# Patient Record
Sex: FEMALE | Race: WHITE | NOT HISPANIC OR LATINO | Employment: FULL TIME
[De-identification: names, ages, dates, MRNs, and addresses within clinical notes are randomized per-mention and may not be internally consistent; named-entity substitution may affect disease eponyms.]

---

## 2017-12-03 ENCOUNTER — HEALTH MAINTENANCE LETTER (OUTPATIENT)
Age: 27
End: 2017-12-03

## 2020-03-02 ENCOUNTER — HEALTH MAINTENANCE LETTER (OUTPATIENT)
Age: 30
End: 2020-03-02

## 2023-06-20 NOTE — TELEPHONE ENCOUNTER
DIAGNOSIS: LT hip pain / no img / self / [employer] / [facility]   APPOINTMENT DATE: 6/27/23   NOTES STATUS DETAILS   MEDICATION LIST Internal

## 2023-06-27 ENCOUNTER — PRE VISIT (OUTPATIENT)
Dept: ORTHOPEDICS | Facility: CLINIC | Age: 33
End: 2023-06-27

## 2023-06-27 ENCOUNTER — ANCILLARY PROCEDURE (OUTPATIENT)
Dept: GENERAL RADIOLOGY | Facility: CLINIC | Age: 33
End: 2023-06-27
Attending: FAMILY MEDICINE
Payer: COMMERCIAL

## 2023-06-27 ENCOUNTER — OFFICE VISIT (OUTPATIENT)
Dept: ORTHOPEDICS | Facility: CLINIC | Age: 33
End: 2023-06-27
Payer: COMMERCIAL

## 2023-06-27 DIAGNOSIS — M25.552 ACUTE PAIN OF LEFT HIP: Primary | ICD-10-CM

## 2023-06-27 DIAGNOSIS — M25.559 HIP PAIN: ICD-10-CM

## 2023-06-27 DIAGNOSIS — Z87.312 HISTORY OF STRESS FRACTURE OF HIP: ICD-10-CM

## 2023-06-27 PROCEDURE — 73502 X-RAY EXAM HIP UNI 2-3 VIEWS: CPT | Mod: GC | Performed by: RADIOLOGY

## 2023-06-27 PROCEDURE — 99203 OFFICE O/P NEW LOW 30 MIN: CPT | Performed by: FAMILY MEDICINE

## 2023-06-27 NOTE — PROGRESS NOTES
"CHIEF COMPLAINT:  Pain of the Left Hip     HISTORY OF PRESENT ILLNESS  Ms. Medel is a pleasant 32 year old year old female who presents to clinic today with left hip pain.  Diane explains that she has had left hip for 3-4 weeks without injury. The pain is located over the groin/anterior hip and also posterior hip.     Pain is worse with running activities or long bike rides. Training in April/May for 10-mile run.  Ran 3-5 miles/day ever other day.  She is not taking any medication for pain. She has a hx of stress fractures, in the left hip as well. Most recent stress fracture was in 2020 left sacrum. She believes she has hd 2 stress fx in the left hip, 1 in sacrum and one in left foot, fourth metatarsal back in 2014.     Onset: gradual  Location: left hip  Quality:  aching  Duration: 1 months   Severity: 1/10 at worst  Timing:intermittent episodes   Modifying factors:  resting and non-use makes it better, movement and use makes it worse  Associated signs & symptoms: pain  Previous similar pain: No  Treatments to date: None    Additional history: Diane notes that she has had a history of anorexia lasting into adulthood but not in the last \"few years\".  Had no issues with pregnancy and no longer follows a calorie restricted diet.  Takes Calcium 500mg daily.  Likes ice cream and low fat dairy. Menstrual cycles are not normal due to OCP since delivery of child.    Review of Systems:    Have you recently had a a fever, chills, weight loss? No    Do you have any vision problems? No    Do you have any chest pain or edema? No    Do you have any shortness of breath or wheezing?  No    Do you have stomach problems? No    Do you have any numbness or focal weakness? No    Do you have diabetes? No    Do you have problems with bleeding or clotting? No    Do you have an rashes or other skin lesions? No    MEDICAL HISTORY  Patient Active Problem List   Diagnosis     CARDIOVASCULAR SCREENING; LDL GOAL LESS THAN 160     Atypical " nevus     BMI <19     Atypical nevus of buttock       Current Outpatient Medications   Medication Sig Dispense Refill     sertraline (ZOLOFT) 50 MG tablet Take 50 mg by mouth daily       norethindrone-ethinyl estradiol-iron (MICROGESTIN FE1.5/30) 1.5-30 MG-MCG tablet Take 1 tablet by mouth daily (Patient not taking: Reported on 6/27/2023) 3 Package 3       No Known Allergies    Family History   Problem Relation Age of Onset     Arthritis Mother      Arthritis Father      Diabetes Maternal Grandmother      Diabetes Other      Other - See Comments Mother         Hysterectomy      Depression Mother      Anxiety Disorder Mother      Osteoporosis Other      Lipids Maternal Grandfather      Blood Disease Maternal Grandfather         blood  clots       Additional medical/Social/Surgical histories reviewed in [facility] and updated as appropriate.       PHYSICAL EXAM  There were no vitals taken for this visit.    General  - normal appearance, in no obvious distress  Musculoskeletal - Left hip  - stance: normal gait without limp, no obvious leg length discrepancy   - inspection: no swelling or effusion, normal bone and joint alignment, no obvious deformity  - palpation: no lateral or anterior hip tenderness. Tender central left gluteal region near piriformis.  - ROM: Minimal pain with flexion and internal rotation, normal extension, external rotation  - strength: 5/5 in all planes  - special tests:  (-) PRADEEP  (+) FADIR  no pain with axial femoral load  Neuro  - no sensory or motor deficit, grossly normal coordination, normal muscle tone    IMAGING : XR pelvis with left hip. Final results and radiologist's interpretation, available in the [facility] health record. Images were reviewed with the patient/family members in the office today. My personal interpretation of the performed imaging is no acute osseous abnormalities.      ASSESSMENT & PLAN  Ms. Medel is a 32 year old year old female with past medical history of anorexia, left hip,  sacrum and metatarsal stress fractures who presents to clinic today with acute left hip pain that began after training for a 10-mile.      She ceased all high impact activity running and has no pain with typical ambulation.    Diagnosis: Acute pain of left hip.    -Avoid moderate or high impact activity for now, stop biking as this was painful as well  -MRI left hip w/o contrast given history of stress fractures of hip  -Referral / workup for bone health if stress related injury seen  -Consider crutches if painful WB starts  -Continue calcium supplement  -Follow up after MRI to review and discuss next steps.    It was a pleasure seeing Diane today.    Elvin Granger DO, CAQSM  Primary Care Sports Medicine

## 2023-06-27 NOTE — LETTER
"  6/27/2023      RE: Diane Medel  [address]  [place] [address]     Dear Colleague,    Thank you for referring your patient, Diane Medel, to the [facility]IN[facility] [place]. Please see a copy of my visit note below.    CHIEF COMPLAINT:  Pain of the Left Hip     HISTORY OF PRESENT ILLNESS  Ms. Medel is a pleasant 32 year old year old female who presents to clinic today with left hip pain.  Diane explains that she has had left hip for 3-4 weeks without injury. The pain is located over the groin/anterior hip and also posterior hip.     Pain is worse with running activities or long bike rides. Training in April/May for 10-mile run.  Ran 3-5 miles/day ever other day.  She is not taking any medication for pain. She has a hx of stress fractures, in the left hip as well. Most recent stress fracture was in 2020 left sacrum. She believes she has hd 2 stress fx in the left hip, 1 in sacrum and one in left foot, fourth metatarsal back in 2014.     Onset: gradual  Location: left hip  Quality:  aching  Duration: 1 months   Severity: 1/10 at worst  Timing:intermittent episodes   Modifying factors:  resting and non-use makes it better, movement and use makes it worse  Associated signs & symptoms: pain  Previous similar pain: No  Treatments to date: None    Additional history: Diane notes that she has had a history of anorexia lasting into adulthood but not in the last \"few years\".  Had no issues with pregnancy and no longer follows a calorie restricted diet.  Takes Calcium 500mg daily.  Likes ice cream and low fat dairy. Menstrual cycles are not normal due to OCP since delivery of child.    Review of Systems:    Have you recently had a a fever, chills, weight loss? No    Do you have any vision problems? No    Do you have any chest pain or edema? No    Do you have any shortness of breath or wheezing?  No    Do you have stomach problems? No    Do you have any numbness or focal weakness? " No    Do you have diabetes? No    Do you have problems with bleeding or clotting? No    Do you have an rashes or other skin lesions? No    MEDICAL HISTORY  Patient Active Problem List   Diagnosis     CARDIOVASCULAR SCREENING; LDL GOAL LESS THAN 160     Atypical nevus     BMI <19     Atypical nevus of buttock       Current Outpatient Medications   Medication Sig Dispense Refill     sertraline (ZOLOFT) 50 MG tablet Take 50 mg by mouth daily       norethindrone-ethinyl estradiol-iron (MICROGESTIN FE1.5/30) 1.5-30 MG-MCG tablet Take 1 tablet by mouth daily (Patient not taking: Reported on 6/27/2023) 3 Package 3       No Known Allergies    Family History   Problem Relation Age of Onset     Arthritis Mother      Arthritis Father      Diabetes Maternal Grandmother      Diabetes Other      Other - See Comments Mother         Hysterectomy      Depression Mother      Anxiety Disorder Mother      Osteoporosis Other      Lipids Maternal Grandfather      Blood Disease Maternal Grandfather         blood  clots       Additional medical/Social/Surgical histories reviewed in [facility] and updated as appropriate.       PHYSICAL EXAM  There were no vitals taken for this visit.    General  - normal appearance, in no obvious distress  Musculoskeletal - Left hip  - stance: normal gait without limp, no obvious leg length discrepancy   - inspection: no swelling or effusion, normal bone and joint alignment, no obvious deformity  - palpation: no lateral or anterior hip tenderness. Tender central left gluteal region near piriformis.  - ROM: Minimal pain with flexion and internal rotation, normal extension, external rotation  - strength: 5/5 in all planes  - special tests:  (-) PRADEEP  (+) FADIR  no pain with axial femoral load  Neuro  - no sensory or motor deficit, grossly normal coordination, normal muscle tone    IMAGING : XR pelvis with left hip. Final results and radiologist's interpretation, available in the [facility] health record. Images were  reviewed with the patient/family members in the office today. My personal interpretation of the performed imaging is no acute osseous abnormalities.      ASSESSMENT & PLAN  Ms. eMdel is a 32 year old year old female with past medical history of anorexia, left hip, sacrum and metatarsal stress fractures who presents to clinic today with acute left hip pain that began after training for a 10-mile.      She ceased all high impact activity running and has no pain with typical ambulation.    Diagnosis: Acute pain of left hip.    -Avoid moderate or high impact activity for now, stop biking as this was painful as well  -MRI left hip w/o contrast given history of stress fractures of hip  -Referral / workup for bone health if stress related injury seen  -Consider crutches if painful WB starts  -Continue calcium supplement  -Follow up after MRI to review and discuss next steps.    It was a pleasure seeing Diane today.    Elvin Granger DO, [facility]  Primary Care Sports Medicine        Again, thank you for allowing me to participate in the care of your patient.      Sincerely,    Elvin Granger DO

## 2023-07-07 ENCOUNTER — MYC MEDICAL ADVICE (OUTPATIENT)
Dept: ORTHOPEDICS | Facility: CLINIC | Age: 33
End: 2023-07-07

## 2023-07-07 ENCOUNTER — ANCILLARY PROCEDURE (OUTPATIENT)
Dept: MRI IMAGING | Facility: CLINIC | Age: 33
End: 2023-07-07
Attending: FAMILY MEDICINE
Payer: COMMERCIAL

## 2023-07-07 DIAGNOSIS — M25.552 ACUTE PAIN OF LEFT HIP: ICD-10-CM

## 2023-07-07 DIAGNOSIS — Z87.312 HISTORY OF STRESS FRACTURE OF HIP: ICD-10-CM

## 2023-07-07 PROCEDURE — 73721 MRI JNT OF LWR EXTRE W/O DYE: CPT | Mod: LT | Performed by: RADIOLOGY

## 2023-07-18 ENCOUNTER — VIRTUAL VISIT (OUTPATIENT)
Dept: ORTHOPEDICS | Facility: CLINIC | Age: 33
End: 2023-07-18
Payer: COMMERCIAL

## 2023-07-18 DIAGNOSIS — M25.552 ACUTE PAIN OF LEFT HIP: Primary | ICD-10-CM

## 2023-07-18 PROCEDURE — 99213 OFFICE O/P EST LOW 20 MIN: CPT | Mod: VID | Performed by: FAMILY MEDICINE

## 2023-07-18 NOTE — LETTER
7/18/2023       RE: Diane Medel  [address]  [place] [address]     Dear Colleague,    Thank you for referring your patient, Diane Medel, to the [facility] SP[facility]TS [facility]ICINE [facility]IN[facility] [place] [facility]. Please see a copy of my visit note below.    ESTABLISHED PATIENT FOLLOW-UP VIRTUAL:  No chief complaint on file.     This encounter was conducted via telephone in lieu of face-to-face encounter due to precautions implemented during COVID-19 pandemic.     HISTORY OF PRESENT ILLNESS  Ms. Medel is a pleasant 32 year old year old female who presents via telephone today for follow-up of left hip to review MRI results.    Date of injury: Gradual  Date last seen: 6/27/23  Following Therapeutic Plan: Yes, obtained MRI   Pain: 3/10  Function: unchanged  Interval History: She is meeting with me today to review her MRI results.      Additional medical/Social/Surgical histories reviewed in [facility] and updated as appropriate.    REVIEW OF SYSTEMS (7/18/2023)  CONSTITUTIONAL: Denies fever and weight loss  GASTROINTESTINAL: Denies abdominal pain, nausea, vomiting  MUSCULOSKELETAL: See HPI  SKIN: Denies any recent rash or lesion  NEUROLOGICAL: Denies numbness or focal weakness    PHYSICAL EXAMINATION  General Appearance: Well appearing, alert, in no acute distress, well-hydrated, and well nourished  ENT: Pupils equal, round, no conjunctival injection.  No lid lag  Cardiovascular: no signs of upper or lower extremity edema  Respiratory: no respiratory distress, no audible wheezing, no labored breathing, symmetric thoracic excursion  Psychiatric: mood and affect are appropriate, patient is oriented to time, place and person  Skin: No rashes, lesions, or ecchymosis present    IMAGING :   MR right hip without contrast 7/7/2023 8:31 AM                                                            Impression:  1. Focal tear of the anterior labrum at the chondral labral  junction  at the 2:00 position.   2. Mild intramuscular edema in the left quadratus femoris muscle,  which can be seen in the setting of ischiofemoral impingement  syndrome.     I have personally reviewed the examination and initial interpretation  and I agree with the findings.     JUTTA ELLERMANN, MD     ASSESSMENT & PLAN  Ms. Medel is a 32-year old year old female who presents virtually today for follow up of left hip pain.      MRI fortunately without evidence for stress fracture.  Focal labrum tear likely nidus for anterior hip/groin pain. She does state posterior hip soreness is minimal, possibly ischiofemoral impingement vs. Nonspecific quadratus femoris strain.    Discussed consideration for formal PT re: labrum and possible ischiofemoral impingement. Diane will start by increasing her stretching, using pain as guide and returning to yoga/barre.  We discussed transitioning more heavily to biking and she is already looking at bikes to purchase.  If pain does persist she will reach out for PT order.   We also discussed NSAIDs, considering select US guided CSI to joint if this pain remains recalcitrant.    It was a pleasure seeing Diane.    Video visit start: 0820, end 0840    Elvin Granger DO, CAQSM  Primary Care Sports Medicine  [facility]          Again, thank you for allowing me to participate in the care of your patient.      Sincerely,    Elvin Granger DO

## 2023-07-18 NOTE — PROGRESS NOTES
ESTABLISHED PATIENT FOLLOW-UP VIRTUAL:  No chief complaint on file.     This encounter was conducted via telephone in lieu of face-to-face encounter due to precautions implemented during COVID-19 pandemic.     HISTORY OF PRESENT ILLNESS  Ms. Medel is a pleasant 32 year old year old female who presents via telephone today for follow-up of left hip to review MRI results.    Date of injury: Gradual  Date last seen: 6/27/23  Following Therapeutic Plan: Yes, obtained MRI   Pain: 3/10  Function: unchanged  Interval History: She is meeting with me today to review her MRI results.      Additional medical/Social/Surgical histories reviewed in [facility] and updated as appropriate.    REVIEW OF SYSTEMS (7/18/2023)  CONSTITUTIONAL: Denies fever and weight loss  GASTROINTESTINAL: Denies abdominal pain, nausea, vomiting  MUSCULOSKELETAL: See HPI  SKIN: Denies any recent rash or lesion  NEUROLOGICAL: Denies numbness or focal weakness    PHYSICAL EXAMINATION  General Appearance: Well appearing, alert, in no acute distress, well-hydrated, and well nourished  ENT: Pupils equal, round, no conjunctival injection.  No lid lag  Cardiovascular: no signs of upper or lower extremity edema  Respiratory: no respiratory distress, no audible wheezing, no labored breathing, symmetric thoracic excursion  Psychiatric: mood and affect are appropriate, patient is oriented to time, place and person  Skin: No rashes, lesions, or ecchymosis present    IMAGING :   MR right hip without contrast 7/7/2023 8:31 AM                                                            Impression:  1. Focal tear of the anterior labrum at the chondral labral junction  at the 2:00 position.   2. Mild intramuscular edema in the left quadratus femoris muscle,  which can be seen in the setting of ischiofemoral impingement  syndrome.     I have personally reviewed the examination and initial interpretation  and I agree with the findings.     JUTTA ELLERMANN, MD     ASSESSMENT &  PLAN  Ms. Medel is a 32-year old year old female who presents virtually today for follow up of left hip pain.      MRI fortunately without evidence for stress fracture.  Focal labrum tear likely nidus for anterior hip/groin pain. She does state posterior hip soreness is minimal, possibly ischiofemoral impingement vs. Nonspecific quadratus femoris strain.    Discussed consideration for formal PT re: labrum and possible ischiofemoral impingement. Diane will start by increasing her stretching, using pain as guide and returning to yoga/barre.  We discussed transitioning more heavily to biking and she is already looking at bikes to purchase.  If pain does persist she will reach out for PT order.   We also discussed NSAIDs, considering select US guided CSI to joint if this pain remains recalcitrant.    It was a pleasure seeing Diane.    Video visit start: 0820, end 0840    Elvin Granger DO, CAM  Primary Care Sports Medicine  [facility]

## 2025-07-08 ENCOUNTER — OFFICE VISIT (OUTPATIENT)
Dept: ORTHOPEDICS | Facility: CLINIC | Age: 35
End: 2025-07-08
Payer: COMMERCIAL

## 2025-07-08 ENCOUNTER — ANCILLARY PROCEDURE (OUTPATIENT)
Dept: MRI IMAGING | Facility: CLINIC | Age: 35
End: 2025-07-08
Attending: STUDENT IN AN ORGANIZED HEALTH CARE EDUCATION/TRAINING PROGRAM
Payer: COMMERCIAL

## 2025-07-08 ENCOUNTER — RESULTS FOLLOW-UP (OUTPATIENT)
Dept: ORTHOPEDICS | Facility: CLINIC | Age: 35
End: 2025-07-08

## 2025-07-08 ENCOUNTER — ANCILLARY PROCEDURE (OUTPATIENT)
Dept: GENERAL RADIOLOGY | Facility: CLINIC | Age: 35
End: 2025-07-08
Attending: STUDENT IN AN ORGANIZED HEALTH CARE EDUCATION/TRAINING PROGRAM
Payer: COMMERCIAL

## 2025-07-08 DIAGNOSIS — M25.551 RIGHT HIP PAIN: ICD-10-CM

## 2025-07-08 DIAGNOSIS — M25.551 RIGHT HIP PAIN: Primary | ICD-10-CM

## 2025-07-08 PROCEDURE — 73502 X-RAY EXAM HIP UNI 2-3 VIEWS: CPT | Mod: RT | Performed by: RADIOLOGY

## 2025-07-08 PROCEDURE — 73721 MRI JNT OF LWR EXTRE W/O DYE: CPT | Mod: RT | Performed by: RADIOLOGY

## 2025-07-08 NOTE — PROGRESS NOTES
Sports Medicine Clinic           ASSESSMENT and PLAN:     Diane was seen today for pain.    Diagnoses and all orders for this visit:    Right hip pain  Right hip pain inpatient training for half marathon, with history of multiple bone stress injuries, including previous femoral neck stress fracture which this feels very similar to.  She does have a history of torn labrum on the contralateral side, and we discussed that this may present in a similar way, and she may also have some hip flexor tendinopathy as activation of her hip flexor does cause some groin discomfort, but will need to rule out a femoral neck stress fracture.  While awaiting MRI we will have her stop all high-impact activity or activity that causes pain.  If pain is worsening will need to use crutches.  -     X-ray Pelvis w Hip Right G/E 2 Views; Future  -     MR Hip Right w/o Contrast; Future  -     Sports Med Adult Follow-Up Clinic Order; Future  -     will call patient with resutls    Return sooner if develops new or worsening symptoms.    Options for treatment and/or follow-up care were reviewed with the patient was actively involved in the decision making process. Patient verbalized understanding and was in agreement with the plan.    Naz Haley MD, [facility]  Primary Care Sports Medicine           SUBJECTIVE       Diane Medel is a 34 year old female who is seen  as self referral presenting with right hip pain.     Injury: Training for a half marathon     Location of Pain: right hip; groin   Duration of Pain: 1 day(s)  Rating of Pain: 3/10  Pain is better with: Rest  Pain is worse with: Biking, walking uphill   Additional Features: Tingling medial thigh to ankle   Treatment so far consists of: Not currently   Prior History of related problems: history of stress fractures in her hip-  Last time was 2020     3 stress fractures in the past. One with a femoral neck stress fracutre on the right side. This feels the most similar to a femoral neck  stress reaction. It has only been going on for a day, but she is very nervous about it. She is training for a half marathon. She started weight lifting in December, and she has been running for three months. She did her first 8 mile run on Sunday. First 8 mile run in 4 years. Has been doing 25 miles per week or so. She has been biking 20-25 miles per week and still doing strength training. She also had a slip on the stairs 2 days ago. Missed a step and came down hard.     She did get a DXA scan through her gyn and was told that it was normal.     PMH, Medications and Allergies were reviewed and updated as needed.    ROS:  As noted above otherwise negative.    Patient Active Problem List   Diagnosis    CARDIOVASCULAR SCREENING; LDL GOAL LESS THAN 160    Atypical nevus    BMI <19    Atypical nevus of buttock       Current Outpatient Medications   Medication Sig Dispense Refill    norethindrone-ethinyl estradiol-iron (MICROGESTIN FE1.5/30) 1.5-30 MG-MCG tablet Take 1 tablet by mouth daily (Patient not taking: Reported on 6/27/2023) 3 Package 3    sertraline (ZOLOFT) 50 MG tablet Take 50 mg by mouth daily              OBJECTIVE:       Vitals: There were no vitals filed for this visit.  BMI: There is no height or weight on file to calculate BMI.    Gen:  Well nourished and in no acute distress  HEENT: Extraocular movement intact  Neck: Supple  Pulm:  Breathing Comfortably. No increased respiratory effort.  Psych: Euthymic. Appropriately answers questions    MSK:   RIGHT HIP  Inspection:    No swelling, bruising, discoloration, or obvious deformity or asymmetry  Palpation:    Tender about the anterior groin/joint line. Otherwise all other landmarks are nontender.    Crepitus is Absent  Active Range of Motion:     Flexion full, extension full / IR full / ER  full  Strength:    Flexion 5-/5, painful / extension 5/5 / adduction 5/5 / abduction 5/5  Special Tests:    Positive: anterior impingement (FADIR)    Negative:  PRADEEP Reina    Imaging was personally reviewed and interpreted by me.   XRAY right hip (7/6/2025): No acute bony abnormality, however some slight irregularity of the femoral neck, likely representing old femoral neck stress fracture as this is unchanged from previous x-ray in 2023

## 2025-07-08 NOTE — LETTER
7/8/2025      RE: Diane Medel  [address]  [place] [address]     Dear Colleague,    Thank you for referring your patient, Diane Medel, to the [facility] [place]. Please see a copy of my visit note below.    Sports Medicine Clinic           ASSESSMENT and PLAN:     Diane was seen today for pain.    Diagnoses and all orders for this visit:    Right hip pain  Right hip pain inpatient training for half marathon, with history of multiple bone stress injuries, including previous femoral neck stress fracture which this feels very similar to.  She does have a history of torn labrum on the contralateral side, and we discussed that this may present in a similar way, and she may also have some hip flexor tendinopathy as activation of her hip flexor does cause some groin discomfort, but will need to rule out a femoral neck stress fracture.  While awaiting MRI we will have her stop all high-impact activity or activity that causes pain.  If pain is worsening will need to use crutches.  -     X-ray Pelvis w Hip Right G/E 2 Views; Future  -     MR Hip Right w/o Contrast; Future  -     Sports Med Adult Follow-Up Clinic Order; Future  -     will call patient with resutls    Return sooner if develops new or worsening symptoms.    Options for treatment and/or follow-up care were reviewed with the patient was actively involved in the decision making process. Patient verbalized understanding and was in agreement with the plan.    Naz Haley MD, [facility]  Primary Care Sports Medicine           SUBJECTIVE       Diane Medel is a 34 year old female who is seen  as self referral presenting with right hip pain.     Injury: Training for a half marathon     Location of Pain: right hip; groin   Duration of Pain: 1 day(s)  Rating of Pain: 3/10  Pain is better with: Rest  Pain is worse with: Biking, walking uphill   Additional Features: Tingling medial thigh to ankle   Treatment so far  consists of: Not currently   Prior History of related problems: history of stress fractures in her hip-  Last time was 2020     3 stress fractures in the past. One with a femoral neck stress fracutre on the right side. This feels the most similar to a femoral neck stress reaction. It has only been going on for a day, but she is very nervous about it. She is training for a half marathon. She started weight lifting in December, and she has been running for three months. She did her first 8 mile run on Sunday. First 8 mile run in 4 years. Has been doing 25 miles per week or so. She has been biking 20-25 miles per week and still doing strength training. She also had a slip on the stairs 2 days ago. Missed a step and came down hard.     She did get a DXA scan through her gyn and was told that it was normal.     PMH, Medications and Allergies were reviewed and updated as needed.    ROS:  As noted above otherwise negative.    Patient Active Problem List   Diagnosis     CARDIOVASCULAR SCREENING; LDL GOAL LESS THAN 160     Atypical nevus     BMI <19     Atypical nevus of buttock       Current Outpatient Medications   Medication Sig Dispense Refill     norethindrone-ethinyl estradiol-iron (MICROGESTIN FE1.5/30) 1.5-30 MG-MCG tablet Take 1 tablet by mouth daily (Patient not taking: Reported on 6/27/2023) 3 Package 3     sertraline (ZOLOFT) 50 MG tablet Take 50 mg by mouth daily              OBJECTIVE:       Vitals: There were no vitals filed for this visit.  BMI: There is no height or weight on file to calculate BMI.    Gen:  Well nourished and in no acute distress  HEENT: Extraocular movement intact  Neck: Supple  Pulm:  Breathing Comfortably. No increased respiratory effort.  Psych: Euthymic. Appropriately answers questions    MSK:   RIGHT HIP  Inspection:    No swelling, bruising, discoloration, or obvious deformity or asymmetry  Palpation:    Tender about the anterior groin/joint line. Otherwise all other landmarks are  nontender.    Crepitus is Absent  Active Range of Motion:     Flexion full, extension full / IR full / ER  full  Strength:    Flexion 5-/5, painful / extension 5/5 / adduction 5/5 / abduction 5/5  Special Tests:    Positive: anterior impingement (FADIR)    Negative: PRADEEP Reina    Imaging was personally reviewed and interpreted by me.   XRAY right hip (7/6/2025): No acute bony abnormality, however some slight irregularity of the femoral neck, likely representing old femoral neck stress fracture as this is unchanged from previous x-ray in 2023          Again, thank you for allowing me to participate in the care of your patient.      Sincerely,    Naz Haley MD